# Patient Record
Sex: FEMALE
[De-identification: names, ages, dates, MRNs, and addresses within clinical notes are randomized per-mention and may not be internally consistent; named-entity substitution may affect disease eponyms.]

---

## 2022-12-19 ENCOUNTER — RX ONLY (OUTPATIENT)
Age: 42
Setting detail: RX ONLY
End: 2022-12-19

## 2022-12-19 ENCOUNTER — APPOINTMENT (RX ONLY)
Dept: URBAN - METROPOLITAN AREA CLINIC 335 | Facility: CLINIC | Age: 42
Setting detail: DERMATOLOGY
End: 2022-12-19

## 2022-12-19 DIAGNOSIS — T07XXXA INSECT BITE, NONVENOMOUS, OF OTHER, MULTIPLE, AND UNSPECIFIED SITES, WITHOUT MENTION OF INFECTION: ICD-10-CM

## 2022-12-19 PROBLEM — S00.36XA INSECT BITE (NONVENOMOUS) OF NOSE, INITIAL ENCOUNTER: Status: ACTIVE | Noted: 2022-12-19

## 2022-12-19 PROCEDURE — 99212 OFFICE O/P EST SF 10 MIN: CPT

## 2022-12-19 PROCEDURE — ? COUNSELING

## 2022-12-19 PROCEDURE — ? PRESCRIPTION

## 2022-12-19 PROCEDURE — ? PRESCRIPTION MEDICATION MANAGEMENT

## 2022-12-19 RX ORDER — CEPHALEXIN 500 MG/1
TABLET ORAL
Qty: 20 | Refills: 0 | Status: CANCELLED

## 2022-12-19 RX ORDER — BACITRACIN 500 [USP'U]/G
OINTMENT TOPICAL
Qty: 28 | Refills: 0 | Status: ERX | COMMUNITY
Start: 2022-12-19

## 2022-12-19 RX ORDER — CEPHALEXIN 500 MG/1
TABLET ORAL
Qty: 20 | Refills: 0 | Status: CANCELLED | COMMUNITY
Start: 2022-12-19

## 2022-12-19 RX ORDER — CEPHALEXIN 500 MG/1
TABLET ORAL
Qty: 21 | Refills: 0 | Status: ERX

## 2022-12-19 RX ADMIN — CEPHALEXIN: 500 TABLET ORAL at 00:00

## 2022-12-19 ASSESSMENT — LOCATION SIMPLE DESCRIPTION DERM: LOCATION SIMPLE: NOSE

## 2022-12-19 ASSESSMENT — LOCATION DETAILED DESCRIPTION DERM: LOCATION DETAILED: NASAL SUPRATIP

## 2022-12-19 ASSESSMENT — LOCATION ZONE DERM: LOCATION ZONE: NOSE

## 2022-12-19 NOTE — HPI: SKIN LESION
What Type Of Note Output Would You Prefer (Optional)?: Bullet Format
Has Your Skin Lesion Been Treated?: not been treated
Is This A New Presentation, Or A Follow-Up?: Skin Lesion
Additional History: Bacitracin was given at emergency room as samples

## 2022-12-19 NOTE — PROCEDURE: PRESCRIPTION MEDICATION MANAGEMENT
Render In Strict Bullet Format?: No
Detail Level: Zone
Initiate Treatment: Bacitracin- twice daily was also sent

## 2022-12-21 ENCOUNTER — APPOINTMENT (RX ONLY)
Dept: URBAN - METROPOLITAN AREA CLINIC 335 | Facility: CLINIC | Age: 42
Setting detail: DERMATOLOGY
End: 2022-12-21

## 2022-12-21 DIAGNOSIS — T07XXXA INSECT BITE, NONVENOMOUS, OF OTHER, MULTIPLE, AND UNSPECIFIED SITES, WITHOUT MENTION OF INFECTION: ICD-10-CM | Status: IMPROVED

## 2022-12-21 PROBLEM — S00.36XA INSECT BITE (NONVENOMOUS) OF NOSE, INITIAL ENCOUNTER: Status: ACTIVE | Noted: 2022-12-21

## 2022-12-21 PROCEDURE — ? COUNSELING

## 2022-12-21 PROCEDURE — 99212 OFFICE O/P EST SF 10 MIN: CPT

## 2022-12-21 PROCEDURE — ? PRESCRIPTION MEDICATION MANAGEMENT

## 2022-12-21 ASSESSMENT — LOCATION ZONE DERM: LOCATION ZONE: NOSE

## 2022-12-21 ASSESSMENT — LOCATION DETAILED DESCRIPTION DERM: LOCATION DETAILED: NASAL SUPRATIP

## 2022-12-21 ASSESSMENT — LOCATION SIMPLE DESCRIPTION DERM: LOCATION SIMPLE: NOSE

## 2022-12-21 NOTE — PROCEDURE: PRESCRIPTION MEDICATION MANAGEMENT
Render In Strict Bullet Format?: No
Detail Level: Zone
Initiate Treatment: Bacitracin- twice daily was also sent
Continue Regimen: Finish Cephalexin

## 2023-01-18 ENCOUNTER — APPOINTMENT (RX ONLY)
Dept: URBAN - METROPOLITAN AREA CLINIC 335 | Facility: CLINIC | Age: 43
Setting detail: DERMATOLOGY
End: 2023-01-18

## 2023-01-18 DIAGNOSIS — L20.89 OTHER ATOPIC DERMATITIS: ICD-10-CM | Status: INADEQUATELY CONTROLLED

## 2023-01-18 DIAGNOSIS — L81.0 POSTINFLAMMATORY HYPERPIGMENTATION: ICD-10-CM | Status: INADEQUATELY CONTROLLED

## 2023-01-18 PROBLEM — L20.84 INTRINSIC (ALLERGIC) ECZEMA: Status: ACTIVE | Noted: 2023-01-18

## 2023-01-18 PROCEDURE — ? PRESCRIPTION

## 2023-01-18 PROCEDURE — ? TREATMENT REGIMEN

## 2023-01-18 PROCEDURE — ? IN-HOUSE DISPENSING PHARMACY

## 2023-01-18 PROCEDURE — ? COUNSELING

## 2023-01-18 PROCEDURE — 99214 OFFICE O/P EST MOD 30 MIN: CPT

## 2023-01-18 RX ORDER — CLOBETASOL PROPIONATE 0.5 MG/G
OINTMENT TOPICAL
Qty: 60 | Refills: 2 | Status: ERX | COMMUNITY
Start: 2023-01-18

## 2023-01-18 RX ORDER — CALCIPOTRIENE 50 UG/G
OINTMENT TOPICAL
Qty: 120 | Refills: 3 | Status: ERX | COMMUNITY
Start: 2023-01-18

## 2023-01-18 RX ADMIN — CALCIPOTRIENE: 50 OINTMENT TOPICAL at 00:00

## 2023-01-18 RX ADMIN — CLOBETASOL PROPIONATE: 0.5 OINTMENT TOPICAL at 00:00

## 2023-01-18 ASSESSMENT — LOCATION DETAILED DESCRIPTION DERM
LOCATION DETAILED: RIGHT NASAL DORSUM
LOCATION DETAILED: MID POSTERIOR NECK
LOCATION DETAILED: RIGHT RADIAL DORSAL HAND
LOCATION DETAILED: RIGHT NASAL TIP

## 2023-01-18 ASSESSMENT — LOCATION SIMPLE DESCRIPTION DERM
LOCATION SIMPLE: POSTERIOR NECK
LOCATION SIMPLE: NOSE
LOCATION SIMPLE: RIGHT HAND

## 2023-01-18 ASSESSMENT — LOCATION ZONE DERM
LOCATION ZONE: NECK
LOCATION ZONE: HAND
LOCATION ZONE: NOSE

## 2023-01-18 NOTE — PROCEDURE: COUNSELING
Cleanser Recommendations: Cetaphil/CeraVe soap - wash the body with the bar soap
Bleach Bath Recommendations: Fill the bathtub with water and 1 capful of bleach into water. Mix water and sit in bathtub for 5 mins, then rinse. This can be done twice a week.
Moisturizer Recommendations: CeraVe cream - moisturize with the cream at least twice a day to keep the body moisturized.  When there is no rash, continue with the Cerave everyday to maintain moisture.
Detail Level: Simple
Antihistamine Recommendations: Claritin daily
Sunscreen Recommendations: SPF 30 or greater

## 2023-01-18 NOTE — PROCEDURE: IN-HOUSE DISPENSING PHARMACY
Product 19 Amount/Unit (Numbers Only): 60
Product 69 Refills: 0
Product 39 Unit Type: mg
Product 6 Unit Type: ml
Product 12 Unit Type: grams
Product 24 Price/Unit (In Dollars): 10
Product 17 Application Directions: Apply a small amount to affected areas twice daily
Product 4 Amount/Unit (Numbers Only): 30
Product 24 Refills: 3
Name Of Product 22: Prednisone 20 mg
Name Of Product 25: Minocycline
Product 2 Application Directions: Apply a thin layer to face every night
Product 15 Refills: 4
Name Of Product 7: Anti-fungal nail solution (CICLOPIROX 8% / FLUCONAZOLE 1% / TERBINAFINE HCL 1%)
Name Of Product 13: Rosacea Cream (AZELAIC ACID 15% / NIACINAMIDE 4%)
Product 26 Application Directions: Take 1 capsule twice daily for 1 week
Product 22 Application Directions: Take 3 tablets by mouth for 3 days, Then take 2 tablets for 3 days, then take 1 tablet for 3 days
Name Of Product 9: Dermatitis cream  (CLOBETASOL PROPIONATE 0.05% / NIACINAMIDE 4%)
Product 5 Price/Unit (In Dollars): 45
Product 20 Price/Unit (In Dollars): 15
Product 15 Amount/Unit (Numbers Only): 120
Product 13 Application Directions: Apply to affected areas of the face twice daily.
Product 7 Application Directions: Apply to the affected nails every night. Once a week clean nails off     \\nwith alcohol or acetone.
Product 11 Price/Unit (In Dollars): 20
Product 26 Unit Type: tablets
Name Of Product 3: Acne gel combo  (BENZOYL PEROXIDE 5% / CLINDAMYCIN 1% / NIACINAMIDE 4%)
Name Of Product 18: Bactrim DS
Product 11 Refills: 2
Product 9 Application Directions: Apply to affected area twice a day, three times a week
Product 8 Price/Unit (In Dollars): 35
Product 16 Application Directions: Apply a small amount to affected areas three times a week
Product 1 Application Directions: Apply a thin layer to face every morning
Name Of Product 12: Melasma Emulsion  (HYDROQUINONE 4% / TRETINOIN 0.025% / TRIAMCINOLONE ACETONIDE 0.025%)
Name Of Product 6: Alopecia Solution (FLUOCINOLONE ACETONIDE 0.01% / MINOXIDIL 5% / TRETINOIN 0.025%)
Product 23 Amount/Unit (Numbers Only): 5
Product 12 Units Dispensed: 1
Product 21 Application Directions: Take 1 capsule by mouth twice daily
Product 4 Price/Unit (In Dollars): 55
Product 12 Application Directions: Apply a thin layer of Melasma Emulsion to the entire face at night time. For ultimate results we recommend using the Melasma Emulsion with Lytera, a product sold through our office. Every morning you will apply  a thin layer  of Lytera to the entire face. You will do the above regimen for 2 months. After 2 months, you will temporarily discontinue the Melasma Emulsion & only use the Lytera twice a day for 2 months. After the 4 months, we recommend that you have a follow up appointment with your provider to evaluate if any other changes are needed.   If the  skin gets too dry or irritated with the Melasma Emulsion, then use it every third night.  The Melasma Emulsion will make your skin more sun-sensitive. Use a sunscreen daily of at least SPF 30, also \\n reapplying throughout the day is very important. Because Melasma Emulsion not meant to be used long term due to the potential side effects, we recommend to use lytera on your off days.
Product 6 Application Directions: Apply to the affected areas on the scalp Monday, Wednesday and Friday.
Product 25 Unit Type: capsules
Name Of Product 17: Xerosis Gel ( ALOE VERA 1% / LACTIC ACID 10% / UREA 40%)
Name Of Product 2: Acne moisturizing cream (NIACINAMIDE 4% / TRETINOIN 0.05% / HYALURONIC ACID 0.5% CREAM)
Name Of Product 26: Cephalexin
Name Of Product 15: Anti-Fungal Shampoo ( KETOCONAZOLE 2% / SALICYLIC ACID 2% / ZINC PYRITHIONE 0.2%)
Detail Level: Zone
Product 24 Application Directions: Take 1 tablet twice daily
Send Charges To Patient Encounter: Yes
Product 15 Application Directions: Shampoo into hair three times a week
Name Of Product 20: Loratadine
Product 26 Amount/Unit (Numbers Only): 14
Name Of Product 11: Antibacterial ointment ( METRONIDAZOLE 1% / MUPIROCIN 2%)
Name Of Product 5: Actinic Keratosis Gel (IMIQUIMOD 5% / LEVOCETIRIZINE DIHYDROCHLORIDE 1% / TRETINOIN 0.05%)
Product 22 Amount/Unit (Numbers Only): 18
Product 20 Application Directions: Take 1 tablet by mouth daily
Product 3 Price/Unit (In Dollars): 40
Product 5 Application Directions: Apply to the affected areas as directed by your physician. You may experience mild skin irritation, itching, dryness, flaking, scabbing, crusting, redness, or hardening of the skin where medicine was applied. It is important to \\navoid excessive sun exposure and to use sunscreen of 30 SPF or higher.
Name Of Product 16: Dermatitis topical solution (CLOBETASOL PROPIONATE 0.05% / NIACINAMIDE 4%)
Product 11 Application Directions: Apply to affected area twice a day
Name Of Product 1: Acne Gel (DAPSONE 8.5% / NIACINAMIDE 4%)
Product 18 Application Directions: Take 1 tablet twice daily for 1 week
Name Of Product 23: Prednisone 50 mg
Name Of Product 8: Anti-fungal cream (ECONAZOLE NITRATE 1% / NIACINAMIDE 4%)
Name Of Product 14: Rosacea Silicone Gel (IVERMECTIN 1% / METRONIDAZOLE 1% / NIACINAMIDE 4% / POTASSIUM AZELOYL DIGLYCINATE 5%)
Product 23 Application Directions: Take 1 tablet every morning for 5 days
Name Of Product 10: Fungal dermatitis cream ( (HYDROCORTISONE 2.5% / KETOCONAZOLE 2%)
Product 8 Application Directions: Apply to affected area twice daily
Product 14 Application Directions: Apply a small amount to face once daily
Name Of Product 4: Acne gel  (ADAPALENE 0.3% / BENZOYL PEROXIDE 2.5% / NIACINAMIDE 4%)
Name Of Product 19: Doxycycline
Product 10 Application Directions: Apply to affected are twice a day
Product 19 Application Directions: Take 1 Capsule twice daily
Product 2 Price/Unit (In Dollars): 50
Name Of Product 24: Spironlactone

## 2023-01-18 NOTE — HPI: RASH
What Type Of Note Output Would You Prefer (Optional)?: Bullet Format
Is The Patient Presenting As Previously Scheduled?: No, they are coming in before their scheduled appointment
How Severe Is Your Rash?: moderate
Is This A New Presentation, Or A Follow-Up?: Rash
Additional History: Previously using calcipotriene

## 2023-01-20 ENCOUNTER — RX ONLY (OUTPATIENT)
Age: 43
Setting detail: RX ONLY
End: 2023-01-20

## 2023-01-20 RX ORDER — CLOBETASOL PROPIONATE 0.5 MG/G
OINTMENT TOPICAL
Qty: 15 | Refills: 3 | Status: ERX

## 2025-06-20 ENCOUNTER — LAB OUTSIDE AN ENCOUNTER (OUTPATIENT)
Dept: URBAN - METROPOLITAN AREA CLINIC 63 | Facility: CLINIC | Age: 45
End: 2025-06-20

## 2025-06-20 ENCOUNTER — DASHBOARD ENCOUNTERS (OUTPATIENT)
Age: 45
End: 2025-06-20

## 2025-06-20 ENCOUNTER — OFFICE VISIT (OUTPATIENT)
Dept: URBAN - METROPOLITAN AREA CLINIC 63 | Facility: CLINIC | Age: 45
End: 2025-06-20
Payer: COMMERCIAL

## 2025-06-20 DIAGNOSIS — Z86.0100 HX OF COLONIC POLYPS: ICD-10-CM

## 2025-06-20 DIAGNOSIS — K59.09 CHRONIC CONSTIPATION: ICD-10-CM

## 2025-06-20 PROCEDURE — 99203 OFFICE O/P NEW LOW 30 MIN: CPT

## 2025-06-20 RX ORDER — ATORVASTATIN CALCIUM 10 MG/1
TOMAR 1 TABLETA VIA ORAL CADA NOCHE A LA HORA DE DORMIR TABLET, FILM COATED ORAL
Qty: 90 EACH | Refills: 0 | Status: ACTIVE | COMMUNITY

## 2025-06-20 RX ORDER — LEVOTHYROXINE SODIUM 0.07 MG/1
TOMAR 1 TABLETA VIA ORAL TODOS LOS DIAS EN LA MANANA CON EL ESTOMAGO VACIO POR 90 DIAS TABLET ORAL
Qty: 90 EACH | Refills: 1 | Status: ACTIVE | COMMUNITY

## 2025-06-20 RX ORDER — LISINOPRIL 5 MG/1
TOMAR 1 TABLETA VIA ORAL 1 VEZ AL DIA TABLET ORAL
Qty: 90 EACH | Refills: 0 | Status: ACTIVE | COMMUNITY

## 2025-06-20 NOTE — HPI-TODAY'S VISIT:
6/25 this is a 45 years old female patient with past medical history of polyp of the colon seen in the office visit today in good general state patient reported last colonoscopy was 5 years ago polyps were found, patient cannot recall pathology result, Patient is without any abdominal complaints. No dysphagia, heartburn, regurgitation, unintentional weight loss, nausea, vomiting, hematemesis or melena. Bowels are moving regularly without blood per rectum. No complaints of bloating. No jaundice, icterus. Patient denies having any chest pain, SOB. Denies history of CAD, CHF, COPD, Stroke. Patient denies undergoing any cardiac evaluation currently. WIll do colonoscopy.

## 2025-06-25 ENCOUNTER — CLAIMS CREATED FROM THE CLAIM WINDOW (OUTPATIENT)
Dept: URBAN - METROPOLITAN AREA SURGERY CENTER 4 | Facility: SURGERY CENTER | Age: 45
End: 2025-06-25
Payer: COMMERCIAL

## 2025-06-25 ENCOUNTER — CLAIMS CREATED FROM THE CLAIM WINDOW (OUTPATIENT)
Dept: URBAN - METROPOLITAN AREA CLINIC 4 | Facility: CLINIC | Age: 45
End: 2025-06-25
Payer: COMMERCIAL

## 2025-06-25 DIAGNOSIS — K63.5 COLON POLYP: ICD-10-CM

## 2025-06-25 DIAGNOSIS — K64.1 SECOND DEGREE HEMORRHOIDS: ICD-10-CM

## 2025-06-25 DIAGNOSIS — Z86.0100 PERSONAL HISTORY OF COLON POLYPS, UNSPECIFIED: ICD-10-CM

## 2025-06-25 DIAGNOSIS — K62.1 RECTAL POLYP: ICD-10-CM

## 2025-06-25 PROCEDURE — 0529F INTRVL 3/>YR PTS CLNSCP DOCD: CPT | Performed by: INTERNAL MEDICINE

## 2025-06-25 PROCEDURE — 88305 TISSUE EXAM BY PATHOLOGIST: CPT | Performed by: PATHOLOGY

## 2025-06-25 PROCEDURE — 45385 COLONOSCOPY W/LESION REMOVAL: CPT | Performed by: INTERNAL MEDICINE

## 2025-06-25 RX ORDER — LISINOPRIL 5 MG/1
TOMAR 1 TABLETA VIA ORAL 1 VEZ AL DIA TABLET ORAL
Qty: 90 EACH | Refills: 0 | Status: ACTIVE | COMMUNITY

## 2025-06-25 RX ORDER — ATORVASTATIN CALCIUM 10 MG/1
TOMAR 1 TABLETA VIA ORAL CADA NOCHE A LA HORA DE DORMIR TABLET, FILM COATED ORAL
Qty: 90 EACH | Refills: 0 | Status: ACTIVE | COMMUNITY

## 2025-06-25 RX ORDER — LEVOTHYROXINE SODIUM 0.07 MG/1
TOMAR 1 TABLETA VIA ORAL TODOS LOS DIAS EN LA MANANA CON EL ESTOMAGO VACIO POR 90 DIAS TABLET ORAL
Qty: 90 EACH | Refills: 1 | Status: ACTIVE | COMMUNITY

## 2025-07-09 ENCOUNTER — OFFICE VISIT (OUTPATIENT)
Dept: URBAN - METROPOLITAN AREA CLINIC 63 | Facility: CLINIC | Age: 45
End: 2025-07-09
Payer: COMMERCIAL

## 2025-07-09 DIAGNOSIS — K64.1 GRADE II HEMORRHOIDS: ICD-10-CM

## 2025-07-09 DIAGNOSIS — Z86.0100 HX OF COLONIC POLYPS: ICD-10-CM

## 2025-07-09 PROBLEM — 721704005: Status: ACTIVE | Noted: 2025-07-09

## 2025-07-09 PROCEDURE — 99213 OFFICE O/P EST LOW 20 MIN: CPT

## 2025-07-09 RX ORDER — LISINOPRIL 5 MG/1
TOMAR 1 TABLETA VIA ORAL 1 VEZ AL DIA TABLET ORAL
Qty: 90 EACH | Refills: 0 | Status: ACTIVE | COMMUNITY

## 2025-07-09 RX ORDER — LEVOTHYROXINE SODIUM 0.07 MG/1
TOMAR 1 TABLETA VIA ORAL TODOS LOS DIAS EN LA MANANA CON EL ESTOMAGO VACIO POR 90 DIAS TABLET ORAL
Qty: 90 EACH | Refills: 1 | Status: ACTIVE | COMMUNITY

## 2025-07-09 RX ORDER — ATORVASTATIN CALCIUM 10 MG/1
TOMAR 1 TABLETA VIA ORAL CADA NOCHE A LA HORA DE DORMIR TABLET, FILM COATED ORAL
Qty: 90 EACH | Refills: 0 | Status: ACTIVE | COMMUNITY

## 2025-07-09 NOTE — HPI-TODAY'S VISIT:
6/25 this is a 45 years old female patient with past medical history of polyp of the colon seen in the office visit today in good general state patient reported last colonoscopy was 5 years ago polyps were found, patient cannot recall pathology result, Patient is without any abdominal complaints. No dysphagia, heartburn, regurgitation, unintentional weight loss, nausea, vomiting, hematemesis or melena. Bowels are moving regularly without blood per rectum. No complaints of bloating. No jaundice, icterus. Patient denies having any chest pain, SOB. Denies history of CAD, CHF, COPD, Stroke. Patient denies undergoing any cardiac evaluation currently. WIll do colonoscopy.  7/25 Patient is here in a follow-up after the recent colonoscopy. The findings of the procedure and the pathology were discussed with the patient. Patient is without any abdominal complaints. No dysphagia, heartburn, regurgitation, unintentional weight loss, nausea, vomiting, hematemesis or melena. Bowels are moving regularly without blood per rectum. No complaints of bloating. No jaundice, icterus. Will follow up patient as needed patient needs to repeat colonoscopy in 5 years for surveillance.  Colonoscopy 6/25/2025 Findings: A 7 mm polyp was found in the sigmoid colon.  Polyp was sessile.  Polyp was removed cold snare.  Resection retrieval completed. The rectosigmoid colon was moderately redundant. Internal hemorrhoids were found with retroflexion.  Hemorrhoids were grade 2.   Pathology result Colon, rectum, sigmoid, polypectomy hyperplastic polyp.